# Patient Record
Sex: FEMALE | Race: WHITE | NOT HISPANIC OR LATINO | Employment: FULL TIME
[De-identification: names, ages, dates, MRNs, and addresses within clinical notes are randomized per-mention and may not be internally consistent; named-entity substitution may affect disease eponyms.]

---

## 2017-08-26 ENCOUNTER — HEALTH MAINTENANCE LETTER (OUTPATIENT)
Age: 32
End: 2017-08-26

## 2019-03-20 ENCOUNTER — TRANSFERRED RECORDS (OUTPATIENT)
Dept: MULTI SPECIALTY CLINIC | Facility: CLINIC | Age: 34
End: 2019-03-20

## 2019-03-29 LAB — HPV ABSTRACT: NORMAL

## 2019-05-22 ENCOUNTER — TRANSFERRED RECORDS (OUTPATIENT)
Dept: HEALTH INFORMATION MANAGEMENT | Facility: CLINIC | Age: 34
End: 2019-05-22

## 2019-05-22 LAB — PAP-ABSTRACT: NORMAL

## 2021-05-13 ENCOUNTER — OFFICE VISIT - RIVER FALLS (OUTPATIENT)
Dept: FAMILY MEDICINE | Facility: CLINIC | Age: 36
End: 2021-05-13

## 2021-05-19 ENCOUNTER — OFFICE VISIT - RIVER FALLS (OUTPATIENT)
Dept: FAMILY MEDICINE | Facility: CLINIC | Age: 36
End: 2021-05-19

## 2021-05-19 ASSESSMENT — MIFFLIN-ST. JEOR: SCORE: 1424.57

## 2022-02-12 VITALS
HEIGHT: 66 IN | TEMPERATURE: 98.4 F | HEART RATE: 98 BPM | WEIGHT: 160 LBS | BODY MASS INDEX: 25.71 KG/M2 | DIASTOLIC BLOOD PRESSURE: 86 MMHG | OXYGEN SATURATION: 98 % | SYSTOLIC BLOOD PRESSURE: 122 MMHG

## 2022-02-15 NOTE — NURSING NOTE
Comprehensive Intake Entered On:  5/19/2021 4:03 PM CDT    Performed On:  5/19/2021 3:56 PM CDT by Talia Martínez CMA               Summary   Chief Complaint :   Establish care-Broke JULIANNE calvillo in Jan-has been doing PT at  PT but still c/o pain.    Last Menstrual Period :   5/19/2021 CDT   Weight Measured :   160 lb(Converted to: 160 lb 0 oz, 72.575 kg)    Height Measured :   65.5 in(Converted to: 5 ft 5 in, 166.37 cm)    Body Mass Index :   26.22 kg/m2 (HI)    Body Surface Area :   1.83 m2   Systolic Blood Pressure :   122 mmHg   Diastolic Blood Pressure :   86 mmHg (HI)    Mean Arterial Pressure :   98 mmHg   Peripheral Pulse Rate :   98 bpm   BP Site :   Right arm   BP Method :   Manual   Temperature Tympanic :   98.4 DegF(Converted to: 36.9 DegC)    Oxygen Saturation :   98 %   Talia Martínez CMA - 5/19/2021 3:56 PM CDT   Health Status   Allergies Verified? :   Yes   Medication History Verified? :   Yes   Pre-Visit Planning Status :   Completed   Tobacco Use? :   Never smoker   Talia Martínez CMA - 5/19/2021 3:56 PM CDT   Consents   Consent for Immunization Exchange :   Consent Granted   Consent for Immunizations to Providers :   Consent Granted   Talia Martínez CMA - 5/19/2021 3:56 PM CDT   Meds / Allergies   (As Of: 5/19/2021 4:03:20 PM CDT)   Allergies (Active)   codeine  Estimated Onset Date:   Unspecified ; Created By:   Talia Martínez CMA; Reaction Status:   Active ; Category:   Drug ; Substance:   codeine ; Type:   Allergy ; Updated By:   Talia Martínez CMA; Reviewed Date:   5/19/2021 3:58 PM CDT      Percocet  Estimated Onset Date:   Unspecified ; Created By:   Talia Martníez CMA; Reaction Status:   Active ; Category:   Drug ; Substance:   Percocet ; Type:   Allergy ; Updated By:   Talia Martínez CMA; Reviewed Date:   5/19/2021 3:58 PM CDT      Vicodin  Estimated Onset Date:   Unspecified ; Created By:   Talia Martínez CMA; Reaction Status:   Active ; Category:   Drug ; Substance:   Vicodin ; Type:   Allergy  ; Updated By:   Talia Martínez CMA; Reviewed Date:   5/19/2021 3:58 PM CDT        Medication List   (As Of: 5/19/2021 4:03:20 PM CDT)   Home Meds    bifidobacterium-lactobacillus  :   bifidobacterium-lactobacillus ; Status:   Documented ; Ordered As Mnemonic:   Probiotic + Colostrum ; Simple Display Line:   1 tab, Oral, daily, 0 Refill(s) ; Catalog Code:   bifidobacterium-lactobacillus ; Order Dt/Tm:   5/19/2021 3:59:32 PM CDT          escitalopram  :   escitalopram ; Status:   Documented ; Ordered As Mnemonic:   Lexapro 10 mg oral tablet ; Simple Display Line:   10 mg, 1 tab(s), Oral, daily, 0 Refill(s) ; Catalog Code:   escitalopram ; Order Dt/Tm:   5/19/2021 3:58:47 PM CDT          Miscellaneous Prescription  :   Miscellaneous Prescription ; Status:   Documented ; Ordered As Mnemonic:   Misc Rx ; Simple Display Line:   Trace Minerals, 0 Refill(s) ; Catalog Code:   Miscellaneous Prescription ; Order Dt/Tm:   5/19/2021 3:59:16 PM CDT          Miscellaneous Prescription  :   Miscellaneous Prescription ; Status:   Documented ; Ordered As Mnemonic:   Mold Binders ; Simple Display Line:   0 Refill(s) ; Catalog Code:   Miscellaneous Prescription ; Order Dt/Tm:   5/19/2021 3:59:37 PM CDT          Miscellaneous Prescription  :   Miscellaneous Prescription ; Status:   Documented ; Ordered As Mnemonic:   Vitamin B ; Simple Display Line:   1 tab, Oral, daily, 0 Refill(s) ; Catalog Code:   Miscellaneous Prescription ; Order Dt/Tm:   5/19/2021 4:00:32 PM CDT          omega-3 polyunsaturated fatty acids  :   omega-3 polyunsaturated fatty acids ; Status:   Documented ; Ordered As Mnemonic:   Fish Oil ; Simple Display Line:   1 tab, Oral, daily, 0 Refill(s) ; Catalog Code:   omega-3 polyunsaturated fatty acids ; Order Dt/Tm:   5/19/2021 3:59:25 PM CDT            ID Risk Screen   Recent Travel History :   No recent travel   Family Member Travel History :   No recent travel   Other Exposure to Infectious Disease :   Unknown    COVID-19 Testing Status :   No positive COVID-19 test   Talia Martínez CMA - 5/19/2021 3:56 PM CDT   Social History   Social History   (As Of: 5/19/2021 4:03:20 PM CDT)   Tobacco:  Denies Tobacco Use      Never (less than 100 in lifetime)   (Last Updated: 5/19/2021 4:01:38 PM CDT by Talia Martínez CMA)          Electronic Cigarette/Vaping:  High Risk      Electronic Cigarette Use: Use, within last 90 days.  Type: Cannabinoid infused.   (Last Updated: 5/19/2021 4:01:34 PM CDT by Talia Martínez CMA)

## 2022-02-15 NOTE — NURSING NOTE
CAGE Assessment Entered On:  5/25/2021 7:24 AM CDT    Performed On:  5/19/2021 7:24 AM CDT by Malinda Andrade CMA               Assessment   Have you ever felt you should cut down on your drinking :   No   Have people annoyed you by criticizing your drinking :   No   Have you ever felt bad or guilty about your drinking :   No   Have you ever taken a drink first thing in the morning to steady your nerves or get rid of a hangover (Eye-opener) :   No   CAGE Score :   0    Malinda Andrade CMA - 5/25/2021 7:24 AM CDT

## 2022-02-15 NOTE — TELEPHONE ENCOUNTER
---------------------  From: Laura Huang MD   Sent: 5/27/2021 10:39:25 AM CDT  Subject: MRI results     ** Submitted: **  Order:Referral (Request)  Details:  5/27/2021 10:37 AM CDT, Referred to: Physical Therapy, Referred to: needs myofascial therapy for chronic back pain, Reason for referral: patient will contact us if needs assistance, Back pain  Compression fracture of L1 vertebra         Signed by Laura Huang MD  5/27/2021 3:37:00 PM [facility]    Called patient with MRI results. Reviewed in details.    Discussed prior PT. Had good success earlier with myofascial release but not getting relief with current therapy. Will check into insurance and let us know if we can help.

## 2022-02-15 NOTE — TELEPHONE ENCOUNTER
---------------------  From: Laura Huang MD   To: [facility] Message Pool ([id number]);     Sent: 5/21/2021 8:48:37 AM CDT  Subject: XR results     ** Submitted: **  Order:MRI Lumbar Spine w/o Contrast (Request)  Details:  Compression fracture of L1 vertebra  Back pain         Signed by Laura Huang MD  5/21/2021 1:48:00 PM [facility]    Please call patient and let her know that radiology read as a healed compression fracture and that disc spaces and alignment were otherwise preserved. As we discussed last night, MRI is next step. Order is placed.Pt notified and no questions-agreed with referral.---------------------  From: Talia Martínez CMA ([facility] Message Pool ([id number]))   To: Referral Coordinators Pool ([id number]);     Sent: 5/21/2021 9:47:34 AM CDT  Subject: FW: XR resultsNoted

## 2022-02-15 NOTE — NURSING NOTE
Depression Screening Entered On:  5/25/2021 7:25 AM CDT    Performed On:  5/19/2021 7:24 AM CDT by Malinda Andrade CMA               Depression Screening   Little Interest - Pleasure in Activities :   Not at all   Feeling Down, Depressed, Hopeless :   Not at all   Initial Depression Screen Score :   0 Score   Poor Appetite or Overeating :   Several days   Trouble Falling or Staying Asleep :   Not at all   Feeling Tired or Little Energy :   Several days   Feeling Bad About Yourself :   Not at all   Trouble Concentrating :   Not at all   Moving or Speaking Slowly :   Not at all   Thoughts Better Off Dead or Hurting Self :   Not at all   Difficulty at Work, Home, Getting Along :   Not difficult at all   Detailed Depression Screen Score :   2    Total Depression Screen Score :   2    Malinda Andrade CMA - 5/25/2021 7:24 AM CDT

## 2022-02-15 NOTE — PROGRESS NOTES
Patient:   MENDEZ BROOKS            MRN: [id number]            FIN: [id number]               Age:   36 years     Sex:  Female     :  1985   Associated Diagnoses:   Compression fracture of L1 vertebra   Author:   Laura Huang MD      Chief Complaint   2021 3:56 PM CDT    Establish care-Broke L1 vertabrae in -has been doing PT at [facility] but still c/o pain.      History of Present Illness   patient with L1 vertebral compression fraccture in January from sleding  had immediate pain, went to ER, XR showed L1 compression fracture  seen by PT first at [facility] then at [facility], has been very compliant, records from physical therapy are referred  pain has been in the same location  has not been improving  patient is very active, [place]  has not had any imaging since the initial ER visit  patient has history of multiple sclerosis, also following with functional medicine physician, history of Lyme disease    no prior fractures that she can remember      Health Status   Allergies:    Allergic Reactions (All)  Severity Not Documented  Codeine (No reactions were documented)  Percocet (No reactions were documented)  Vicodin (No reactions were documented)  Canceled/Inactive Reactions (All)  No known allergies   Medications:  (Selected)   Documented Medications  Documented  Fish Oil: 1 tab, Oral, daily, 0 Refill(s), Type: Maintenance  Lexapro 10 mg oral tablet: = 1 tab(s) ( 10 mg ), Oral, daily, 0 Refill(s), Type: Maintenance  Misc Rx: Trace Minerals, 0 Refill(s), Type: Maintenance  Mold Binders: Mold Binders, 0 Refill(s), Type: Maintenance  Probiotic + Colostrum: 1 tab, Oral, daily, 0 Refill(s), Type: Maintenance  Vitamin B: Vitamin B, 1 tab, Oral, daily, 0 Refill(s), Type: Maintenance      Physical Examination   Vital Signs   2021 3:56 PM CDT Temperature Tympanic 98.4 DegF    Peripheral Pulse Rate 98 bpm    Systolic Blood Pressure 122 mmHg    Diastolic Blood Pressure 86 mmHg  HI    Mean  Arterial Pressure 98 mmHg    BP Site Right arm    BP Method Manual    Oxygen Saturation 98 %      Measurements from flowsheet : Measurements   5/19/2021 3:56 PM CDT Height Measured - Standard 65.5 in    Weight Measured - Standard 160 lb    BSA 1.83 m2    Body Mass Index 26.22 kg/m2  HI      General:  Alert and oriented, No acute distress.    on exam of back, no bony tenderness, pain is lateral to spine, worse with extension, mildly worse with lateral movements, pain starts upper lumbar      Impression and Plan   Diagnosis     Compression fracture of L1 vertebra ([id number] S32.010A).     Course:  Not improving.    Plan:  Will check lumbar spine XR today. Unless significant finding, will likely need MRI for next steps.

## 2022-02-28 DIAGNOSIS — F41.1 GAD (GENERALIZED ANXIETY DISORDER): Primary | ICD-10-CM

## 2022-02-28 RX ORDER — ESCITALOPRAM OXALATE 10 MG/1
10 TABLET ORAL DAILY
Qty: 30 TABLET | Refills: 0 | Status: SHIPPED | OUTPATIENT
Start: 2022-02-28 | End: 2022-03-09

## 2022-02-28 NOTE — TELEPHONE ENCOUNTER
Patient is needing a refill on Lexaprox 10 mg, patient states she has about a weeks worth left of the medication. Patient uses [facility] in [place]. Patient did schedule a med check with GADIEL.

## 2022-02-28 NOTE — TELEPHONE ENCOUNTER
Prescription approved per [facility] Refill Protocol.  Office visit on 3/9/22  RAFA Scott  [facility]

## 2022-03-02 NOTE — LETTER
(Inserted Image. Unable to display)   January 27, 2022  MENDEZ BROOKS   [address] [place] [address]        Dear MENDEZ,    Thank you for selecting [facility] for your healthcare needs.    Our records indicate you are due for the following services:     Annual Physical     (FYI   Regarding office visits: In some instances, a video visit or telephone visit may be offered as an option.)    To schedule an appointment or if you have further questions, please contact your clinic at [phone].    Powered by [employer]    Sincerely,    Laura Huang MD

## 2022-03-06 PROBLEM — F41.0 PANIC ATTACK: Status: ACTIVE | Noted: 2022-03-06

## 2022-03-06 PROBLEM — F41.1 GENERALIZED ANXIETY DISORDER: Status: ACTIVE | Noted: 2022-03-06

## 2022-03-06 PROBLEM — S32.000A CLOSED WEDGE FRACTURE OF LUMBAR VERTEBRA (H): Status: ACTIVE | Noted: 2022-03-06

## 2022-03-06 PROBLEM — A69.20 LYME DISEASE: Status: ACTIVE | Noted: 2022-03-06

## 2022-03-06 PROBLEM — M67.40 GANGLION OF TENDON SHEATH: Status: ACTIVE | Noted: 2022-03-06

## 2022-03-06 PROBLEM — M25.50 PAIN IN JOINT, MULTIPLE SITES: Status: ACTIVE | Noted: 2022-03-06

## 2022-03-06 PROBLEM — M54.2 NECK PAIN: Status: ACTIVE | Noted: 2022-03-06

## 2022-03-06 PROBLEM — L70.9 ACNE: Status: ACTIVE | Noted: 2022-03-06

## 2022-03-09 ENCOUNTER — OFFICE VISIT (OUTPATIENT)
Dept: FAMILY MEDICINE | Facility: CLINIC | Age: 37
End: 2022-03-09
Payer: COMMERCIAL

## 2022-03-09 ENCOUNTER — TELEPHONE (OUTPATIENT)
Dept: NEUROLOGY | Facility: CLINIC | Age: 37
End: 2022-03-09

## 2022-03-09 VITALS
BODY MASS INDEX: 24.93 KG/M2 | SYSTOLIC BLOOD PRESSURE: 112 MMHG | DIASTOLIC BLOOD PRESSURE: 77 MMHG | TEMPERATURE: 97.8 F | HEART RATE: 76 BPM | HEIGHT: 66 IN | WEIGHT: 155.1 LBS

## 2022-03-09 DIAGNOSIS — G35 MULTIPLE SCLEROSIS (H): ICD-10-CM

## 2022-03-09 DIAGNOSIS — F41.1 GAD (GENERALIZED ANXIETY DISORDER): ICD-10-CM

## 2022-03-09 DIAGNOSIS — Z00.00 WELL ADULT EXAM: Primary | ICD-10-CM

## 2022-03-09 PROCEDURE — 99395 PREV VISIT EST AGE 18-39: CPT | Performed by: FAMILY MEDICINE

## 2022-03-09 PROCEDURE — 96127 BRIEF EMOTIONAL/BEHAV ASSMT: CPT | Performed by: FAMILY MEDICINE

## 2022-03-09 PROCEDURE — 99214 OFFICE O/P EST MOD 30 MIN: CPT | Mod: 25 | Performed by: FAMILY MEDICINE

## 2022-03-09 RX ORDER — ESCITALOPRAM OXALATE 10 MG/1
10 TABLET ORAL DAILY
Qty: 90 TABLET | Refills: 4 | Status: SHIPPED | OUTPATIENT
Start: 2022-03-09 | End: 2022-04-04

## 2022-03-09 ASSESSMENT — ENCOUNTER SYMPTOMS
PARESTHESIAS: 1
ARTHRALGIAS: 1

## 2022-03-09 NOTE — TELEPHONE ENCOUNTER
[facility] Call Center    Phone Message    May a detailed message be left on voicemail: yes     Reason for Call: Appointment Intake    Referring Provider Name: Laura Huang MD   Diagnosis and/or Symptoms: MS    Patient would like to schedule with Dr. Nguyen at the [facility].    Please call her to schedule when Dr. Nguyen's schedule comes out.    Action Taken: Message routed to:  [facility] Neurology    Travel Screening: Not Applicable

## 2022-03-09 NOTE — PROGRESS NOTES
SUBJECTIVE:   CC: Robina Desir is an 37 year old woman who presents for preventive health visit.       Patient here for wellness checkup    Itchy skin on right arm since giving blood. Has been going on for 2-3 weeks. Has not tried anything. Will try topical corticosteroids. Does have many moles.     Diagnosed with multiple sclerosis at age 16. Does not see neurology. Last MRI 2019 at [facility]  Follows with Dr. Reddy for MS, Lyme, and mold issues.   Her prior neurologist was at [facility]. Interested in switching neurologists.    Check moles.    Escitalopram working well. Due for refills. No side effects      Healthy Habits:     Getting at least 3 servings of Calcium per day:  Yes    Bi-annual eye exam:  Yes    Dental care twice a year:  Yes    Sleep apnea or symptoms of sleep apnea:  None    Diet:  Gluten-free/reduced    Frequency of exercise:  1 day/week    Duration of exercise:  Less than 15 minutes    Taking medications regularly:  Yes    Medication side effects:  None    PHQ-2 Total Score: 2    Additional concerns today:  Yes      Pap smear done on this date: 3/2019 (approximately), by this group: [facility] (in [facility]), results were normal.             Today's PHQ-2 Score:   PHQ-2 ( 1999 [employer]) 3/9/2022   Q1: Little interest or pleasure in doing things 1   Q2: Feeling down, depressed or hopeless 1   PHQ-2 Score 2   Q1: Little interest or pleasure in doing things Several days   Q2: Feeling down, depressed or hopeless Several days   PHQ-2 Score 2       Abuse: Current or Past (Physical, Sexual or Emotional) - No  Do you feel safe in your environment? Yes    Have you ever done Advance Care Planning? (For example, a Health Directive, POLST, or a discussion with a medical provider or your loved ones about your wishes): No, advance care planning information given to patient to review.  Patient plans to discuss their wishes with loved ones or provider.      Social History  "    Tobacco Use     Smoking status: Never Smoker     Smokeless tobacco: Never Used   Substance Use Topics     Alcohol use: Yes     If you drink alcohol do you typically have >3 drinks per day or >7 drinks per week? No    Alcohol Use 3/9/2022   Prescreen: >3 drinks/day or >7 drinks/week? No   Prescreen: >3 drinks/day or >7 drinks/week? -       Reviewed orders with patient.  Reviewed health maintenance and updated orders accordingly - Yes      Breast Cancer Screening:    Breast CA Risk Assessment (FHS-7) 3/9/2022   Do you have a family history of breast, colon, or ovarian cancer? No / Unknown         Patient under 40 years of age: Routine Mammogram Screening not recommended.   Pertinent mammograms are reviewed under the imaging tab.    History of abnormal Pap smear: NO - age 30-65 PAP every 5 years with negative HPV co-testing recommended  Last pap 3/20/2019 with negative HPV    Reviewed and updated as needed this visit by clinical staff   Tobacco  Allergies  Meds      Soc Hx        Reviewed and updated as needed this visit by Provider                 Past Medical History:   Diagnosis Date     Allergic rhinitis due to other allergen      Dysmenorrhea      Irritable bowel syndrome      Multiple sclerosis (H) 2001     Other forms of migraine, without mention of intractable migraine without mention of status migrainosus      Profound, moderate or severe vision impairment, not further specified 2001    right eye 30% vision d/t MS     Urinary frequency       Past Surgical History:   Procedure Laterality Date     [facility] REVISION OF UNSTABLE PATELLA  2000    left knee         Review of Systems   Musculoskeletal: Positive for arthralgias.   Neurological: Positive for paresthesias.   All other systems reviewed and are negative.         OBJECTIVE:   /77 (BP Location: Right arm, Cuff Size: Adult Regular)   Pulse 76   Temp 97.8  F (36.6  C) (Tympanic)   Ht 1.676 m (5' 6\")   Wt 70.4 kg (155 lb 1.6 oz)   BMI 25.03 kg/m  "   Physical Exam  Vitals and nursing note reviewed.   Constitutional:       Appearance: Normal appearance. She is normal weight.   HENT:      Head: Normocephalic and atraumatic.      Right Ear: Tympanic membrane, ear canal and external ear normal.      Left Ear: Tympanic membrane, ear canal and external ear normal.      Nose: Nose normal.      Mouth/Throat:      Mouth: Mucous membranes are moist.      Pharynx: Oropharynx is clear.   Eyes:      Extraocular Movements: Extraocular movements intact.      Conjunctiva/sclera: Conjunctivae normal.      Pupils: Pupils are equal, round, and reactive to light.   Neck:      Thyroid: No thyroid mass or thyromegaly.   Cardiovascular:      Rate and Rhythm: Normal rate and regular rhythm.      Pulses: Normal pulses.      Heart sounds: Normal heart sounds.   Pulmonary:      Effort: Pulmonary effort is normal.      Breath sounds: Normal breath sounds.   Chest:   Breasts:      Right: Normal. No mass, nipple discharge, axillary adenopathy or supraclavicular adenopathy.      Left: Normal. No mass, nipple discharge, axillary adenopathy or supraclavicular adenopathy.       Abdominal:      General: Bowel sounds are normal.      Palpations: Abdomen is soft.   Musculoskeletal:         General: Normal range of motion.      Cervical back: Normal range of motion and neck supple.   Lymphadenopathy:      Cervical: No cervical adenopathy.      Upper Body:      Right upper body: No supraclavicular or axillary adenopathy.      Left upper body: No supraclavicular or axillary adenopathy.   Skin:     General: Skin is warm and dry.      Comments: Patient with benign appearing nevi on chin, back, right wrist. Has dermatofibroma on left calf. No abnormal skin lesions seen   Neurological:      Mental Status: She is alert.   Psychiatric:         Mood and Affect: Mood normal.         Thought Content: Thought content normal.           Diagnostic Test Results:  Neurology notes from 2019 from [place], consent form  "signed for [facility] Everywhere    ASSESSMENT/PLAN:   (Z00.00) Well adult exam  (primary encounter diagnosis)  Comment: Stable and up-to-date  Plan: Next Pap smear due in 2024.  No early cancer screening indicated by family history.  Consider fasting labs at age 46    (G35) Multiple sclerosis (H)  Comment: Not currently on any disease modulating treatment  Plan: MR Brain w/o & w Contrast, MR Thoracic Spine         w/o & w Contrast, MRI CERVICAL SPINE WO&W CONT,        Adult Neurology  Referral        Will order MRIs as she has not had them for approximately 3 years.  She is interested in following up with physician who is new to [facility].  I have put through the consult for adult neurology referral.  She will let me know if she has any concerns or any issues with getting scheduled     (F41.1) KIRSTIN (generalized anxiety disorder)  Comment: Stable  Plan: escitalopram (LEXAPRO) 10 MG tablet        Continue current dose of Escitalopram refilled for a year      Patient has been advised of split billing requirements and indicates understanding: Yes    COUNSELING:  Reviewed preventive health counseling, as reflected in patient instructions       Regular exercise       Healthy diet/nutrition    Estimated body mass index is 25.03 kg/m  as calculated from the following:    Height as of this encounter: 1.676 m (5' 6\").    Weight as of this encounter: 70.4 kg (155 lb 1.6 oz).        She reports that she has never smoked. She has never used smokeless tobacco.      Counseling Resources:  ATP IV Guidelines  Pooled Cohorts Equation Calculator  Breast Cancer Risk Calculator  BRCA-Related Cancer Risk Assessment: FHS-7 Tool  FRAX Risk Assessment  ICSI Preventive Guidelines  Dietary Guidelines for Americans, 2010  USDA's MyPlate  ASA Prophylaxis  Lung CA Screening    Laura Huang MD  [facility]  "

## 2022-04-04 ENCOUNTER — TELEPHONE (OUTPATIENT)
Dept: FAMILY MEDICINE | Facility: CLINIC | Age: 37
End: 2022-04-04
Payer: COMMERCIAL

## 2022-04-04 DIAGNOSIS — F41.1 GAD (GENERALIZED ANXIETY DISORDER): ICD-10-CM

## 2022-04-04 RX ORDER — ESCITALOPRAM OXALATE 10 MG/1
10 TABLET ORAL DAILY
Qty: 90 TABLET | Refills: 4 | Status: SHIPPED | OUTPATIENT
Start: 2022-04-04 | End: 2023-04-28

## 2022-04-04 NOTE — TELEPHONE ENCOUNTER
Reason for Call:  Medication or medication refill:    Do you use a [facility]?  Name of the pharmacy and phone number for the current request:  [facility]    Name of the medication requested: escitalopram    Other request: pt needs Rx faxed to [facility] @ [phone]    Can we leave a detailed message on this number? YES    Phone number patient can be reached at: Cell number on file:    Telephone Information:   Mobile [phone]       Best Time: anytime    Call taken on 4/4/2022 at 10:16 AM by Ifrah Leon

## 2022-04-04 NOTE — TELEPHONE ENCOUNTER
RX sent to [facility]: 3/9/22 for #90    [facility] will give patient 10 day supply as waiting on [facility].

## 2022-04-21 ENCOUNTER — TELEPHONE (OUTPATIENT)
Dept: FAMILY MEDICINE | Facility: CLINIC | Age: 37
End: 2022-04-21
Payer: COMMERCIAL

## 2022-04-21 DIAGNOSIS — G35 MULTIPLE SCLEROSIS (H): Primary | ICD-10-CM

## 2022-04-21 NOTE — TELEPHONE ENCOUNTER
[facility] calling and patient has a MRI scheduled for next week they stated that they will need a new order need to add cervical and thoracic with and without contrast. Please send new order.

## 2022-05-01 ENCOUNTER — HEALTH MAINTENANCE LETTER (OUTPATIENT)
Age: 37
End: 2022-05-01

## 2022-05-03 ENCOUNTER — TELEPHONE (OUTPATIENT)
Dept: NEUROLOGY | Facility: CLINIC | Age: 37
End: 2022-05-03
Payer: COMMERCIAL

## 2022-05-03 NOTE — TELEPHONE ENCOUNTER
Health Call Center    Phone Message    May a detailed message be left on voicemail: yes     Reason for Call: Appointment Intake    Referring Provider Name: IVAN GALLEGO  Diagnosis and/or Symptoms: MS    Patient is referred to be scheduled with Dr. Nguyen. Can you please add her to the wait list to be scheduled.     Please call patient with additional questions at # [phone]    Action Taken: Message routed to:  [facility] Neurology     Travel Screening: Not Applicable

## 2022-06-20 ENCOUNTER — LAB (OUTPATIENT)
Dept: LAB | Facility: CLINIC | Age: 37
End: 2022-06-20
Payer: COMMERCIAL

## 2022-06-20 ENCOUNTER — OFFICE VISIT (OUTPATIENT)
Dept: NEUROLOGY | Facility: CLINIC | Age: 37
End: 2022-06-20
Payer: COMMERCIAL

## 2022-06-20 VITALS — SYSTOLIC BLOOD PRESSURE: 125 MMHG | DIASTOLIC BLOOD PRESSURE: 83 MMHG | HEART RATE: 88 BPM

## 2022-06-20 DIAGNOSIS — E55.9 VITAMIN D DEFICIENCY: ICD-10-CM

## 2022-06-20 DIAGNOSIS — G35 MS (MULTIPLE SCLEROSIS) (H): Primary | ICD-10-CM

## 2022-06-20 PROCEDURE — 36415 COLL VENOUS BLD VENIPUNCTURE: CPT

## 2022-06-20 PROCEDURE — 99204 OFFICE O/P NEW MOD 45 MIN: CPT | Performed by: PSYCHIATRY & NEUROLOGY

## 2022-06-20 PROCEDURE — 82306 VITAMIN D 25 HYDROXY: CPT

## 2022-06-20 NOTE — PROGRESS NOTES
Date of Service: 6/20/2022    [facility] Neurology   MS Clinic Visit    History of present illness: 37-year-old woman with a history of anxiety disorder who presents for evaluation of multiple sclerosis.    She is accompanied by her , Caio, who provides history independently.  Past history as documented in [facility] notes was personally reviewed.    Disease onset at age 16 when she had an episode of optic neuritis on the left side.  She was started on Avonex.  1 year later she had another episode of optic neuritis, but this occurred on the right side.  She had incomplete recovery of symptoms.  She has residual blurriness from the right eye.    She continues on Avonex for a number of years.  She did experience some side effects including flulike side effects.  She ultimately discontinued the medication 2008 when she was planning to consider pregnancy.  She has not been on disease modifying since.    She did have 1 episode of sensory myelopathy in 2007.  This occurred while she was on Avonex.  She became numb from the waist down.  Fortunately she experienced complete recovery of the course of 5 months.    She will experience occasional tingling here and there, though notes that symptoms will last for 5 to 10 minutes.    Denies any gait limitation.  She has been struggling with lower back pain and has been working with occupational therapy to address this.    She works with the functional medicine doctor.  They have been focusing on treating moles.  She also follows the autoimmune protocol diet.    She takes a supplement that does have vitamin D3, though she is uncertain of the exact amount of D3.  She takes a fish oil supplement as well.    She has not experienced any new symptoms related to MS since 2007.  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    Disease onset: Age 16, left optic neuritis  Last relapse: 2007, age 22, sensory myelitis waist  down    DMD history:  Avonex [id number]    Allergies   Allergen Reactions     Codeine      Hydrocodone-Acetaminophen Unknown     Oxycodone-Acetaminophen        Current Outpatient Medications   Medication     B Complex Vitamins (B COMPLEX PO)     CLARITIN 10 MG OR TABS     escitalopram (LEXAPRO) 10 MG tablet     Probiotic Product (PROBIOTIC PO)     No current facility-administered medications for this visit.        Past medical, surgical, social and family history was personally reviewed. Pertinent details noted above.     Physical Examination:   /83 (BP Location: Right arm, Patient Position: Sitting, Cuff Size: Adult Regular)   Pulse 88     General: no acute distress  Cranial nerves:   VFFC  PERRL w/no RAPD  EOM full w/no NANO   Face symmetric  Hearing intact  No dysarthria   Motor:   Tone is normal   Bulk is normal     R L  Deltoid  5 5  Biceps  5 5  Triceps 5 5  Wrist ext 5 5  Finger ext 5 5  Finger abd 5 5    Hip flexion 5 5  Knee flexion 5 5  Knee ext 5 5  Ankle d/f 5 5    Reflexes: 2+ and symmetric throughout, babinski absent bilaterally  Sensory: vibration is minimally reduced in the toes, JPS normal in the toes   Romberg is absent  Coordination: no ataxia or dysmetria  Gait: normal base and stride, tandem gait is intact, able to balance on one foot and hop x 5 bilaterally    Tests/Imaging:   Mri imaging was done at [facility] and [facility], but images are not available for review  Per comparison of the reads it seems as though findings are stable between studies    Assessment: 37-year-old woman with relapsing remitting multiple sclerosis.  She has had a relatively benign course of illness.  However there was evidence of active disease per an MRI in 2019.  I need to personally compare studies from 2019 and 2022.    Reassurance was provided that her clinical examination are stable.  The general report between the 2 studies appear stable as well.  She can likely safely remain off of disease  modifying therapy.  We discussed why disease modifying therapy is oftentimes used for multiple sclerosis.    We reviewed the differentiation between relapsing disease and progressive disease.  She will reach out if she experiences any new symptoms concerning for an MS relapse.  She was encouraged to exercise routinely to decrease her risk for progressive disease.  I agree that the autoimmune protocol can be helpful.  I will check her vitamin D level to ensure adequate supplementation.    Plan:   -Obtain images from [facility] and [facility]  - Vitamin D level  - Follow-up in 1 year    Note was completed with the assistance of Dragon Fluency software which can often result in accidental word substitutions.     A total of 45 minutes on the date of service were spent in the care of this patient.   Melany Nguyen MD on 6/20/2022 at 10:03 AM

## 2022-06-20 NOTE — LETTER
6/20/2022         RE: Robina Desir   [address] [facility] [id number]        Dear Colleague,    Thank you for referring your patient, Robina Desir, to the [facility]. Please see a copy of my visit note below.    Date of Service: 6/20/2022    [facility] Neurology   MS Clinic Visit    History of present illness: 37-year-old woman with a history of anxiety disorder who presents for evaluation of multiple sclerosis.    She is accompanied by her , Caio, who provides history independently.  Past history as documented in [facility] notes was personally reviewed.    Disease onset at age 16 when she had an episode of optic neuritis on the left side.  She was started on Avonex.  1 year later she had another episode of optic neuritis, but this occurred on the right side.  She had incomplete recovery of symptoms.  She has residual blurriness from the right eye.    She continues on Avonex for a number of years.  She did experience some side effects including flulike side effects.  She ultimately discontinued the medication 2008 when she was planning to consider pregnancy.  She has not been on disease modifying since.    She did have 1 episode of sensory myelopathy in 2007.  This occurred while she was on Avonex.  She became numb from the waist down.  Fortunately she experienced complete recovery of the course of 5 months.    She will experience occasional tingling here and there, though notes that symptoms will last for 5 to 10 minutes.    Denies any gait limitation.  She has been struggling with lower back pain and has been working with occupational therapy to address this.    She works with the functional medicine doctor.  They have been focusing on treating moles.  She also follows the autoimmune protocol diet.    She takes a supplement that does have vitamin D3, though she is uncertain of the exact amount of D3.  She takes a fish oil supplement as well.    She has  not experienced any new symptoms related to MS since 2007.  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    Disease onset: Age 16, left optic neuritis  Last relapse: 2007, age 22, sensory myelitis waist down    DMD history:  Avonex [id number]    Allergies   Allergen Reactions     Codeine      Hydrocodone-Acetaminophen Unknown     Oxycodone-Acetaminophen        Current Outpatient Medications   Medication     B Complex Vitamins (B COMPLEX PO)     CLARITIN 10 MG OR TABS     escitalopram (LEXAPRO) 10 MG tablet     Probiotic Product (PROBIOTIC PO)     No current facility-administered medications for this visit.        Past medical, surgical, social and family history was personally reviewed. Pertinent details noted above.     Physical Examination:   /83 (BP Location: Right arm, Patient Position: Sitting, Cuff Size: Adult Regular)   Pulse 88     General: no acute distress  Cranial nerves:   VFFC  PERRL w/no RAPD  EOM full w/no NANO   Face symmetric  Hearing intact  No dysarthria   Motor:   Tone is normal   Bulk is normal     R L  Deltoid  5 5  Biceps  5 5  Triceps 5 5  Wrist ext 5 5  Finger ext 5 5  Finger abd 5 5    Hip flexion 5 5  Knee flexion 5 5  Knee ext 5 5  Ankle d/f 5 5    Reflexes: 2+ and symmetric throughout, babinski absent bilaterally  Sensory: vibration is minimally reduced in the toes, JPS normal in the toes   Romberg is absent  Coordination: no ataxia or dysmetria  Gait: normal base and stride, tandem gait is intact, able to balance on one foot and hop x 5 bilaterally    Tests/Imaging:   Mri imaging was done at [facility] and [facility], but images are not available for review  Per comparison of the reads it seems as though findings are stable between studies    Assessment: 37-year-old woman with relapsing remitting multiple sclerosis.  She has had a relatively benign course of illness.  However there was evidence of active disease per  an MRI in 2019.  I need to personally compare studies from 2019 and 2022.    Reassurance was provided that her clinical examination are stable.  The general report between the 2 studies appear stable as well.  She can likely safely remain off of disease modifying therapy.  We discussed why disease modifying therapy is oftentimes used for multiple sclerosis.    We reviewed the differentiation between relapsing disease and progressive disease.  She will reach out if she experiences any new symptoms concerning for an MS relapse.  She was encouraged to exercise routinely to decrease her risk for progressive disease.  I agree that the autoimmune protocol can be helpful.  I will check her vitamin D level to ensure adequate supplementation.    Plan:   -Obtain images from [facility] and [facility]  - Vitamin D level  - Follow-up in 1 year    Note was completed with the assistance of Dragon Fluency software which can often result in accidental word substitutions.     A total of 45 minutes on the date of service were spent in the care of this patient.   Melany Nguyen MD on 6/20/2022 at 10:03 AM          Again, thank you for allowing me to participate in the care of your patient.        Sincerely,        Melany Nguyen MD

## 2022-06-20 NOTE — PATIENT INSTRUCTIONS
You are doing very well despite your long history of MS    I think it is reasonable to remain off of treatment if your recent MRI demonstrates no change from your prior study at [facility]     Vitamin D level today     Exercise aim for 150 minutes throughout the week intense enough that you are about to break a sweat     Follow up in 1 year

## 2022-06-21 LAB — DEPRECATED CALCIDIOL+CALCIFEROL SERPL-MC: 25 UG/L (ref 20–75)

## 2022-06-28 ENCOUNTER — TELEPHONE (OUTPATIENT)
Dept: NEUROLOGY | Facility: CLINIC | Age: 37
End: 2022-06-28

## 2022-06-28 NOTE — TELEPHONE ENCOUNTER
Please assist in getting last two MRI studies for me to review     2022 - [facility]  2019 - [place]     Thank you! Melany Nguyen MD on 6/28/2022 at 1:57 PM

## 2022-06-28 NOTE — TELEPHONE ENCOUNTER
COREY: I spoke to Echo in the Imaging dept with [facility] ([facility] P: [phone]) and Billy with [facility] ([facility], P: [phone]) and asked them to get the images for you to review. Please let us know if you do not receive the images.     FERNANDO Nguyen on 6/28/2022 at 2:39 PM

## 2022-06-30 NOTE — TELEPHONE ENCOUNTER
KIMBERLEEI: I called [facility] and spoke to Mirna in Radiology. I've asked her to push the images through pacs as soon as possible. Please let me know if you do not receive them. Thank you.      FERNANDO Nguyen on 6/30/2022 at 3:42 PM

## 2022-06-30 NOTE — TELEPHONE ENCOUNTER
Thank you!   The [place] images are now in our system, but we have not received the images from [facility] - can you follow up with them?     Melany Nguyen MD on 6/30/2022 at 12:32 PM

## 2022-07-06 NOTE — TELEPHONE ENCOUNTER
Please notify patient that I reviewed/compared images from 2019 and 2022     No new lesions   No active lesions     Melany Nguyen MD on 7/6/2022 at 3:45 PM

## 2022-07-07 NOTE — TELEPHONE ENCOUNTER
Relayed results onto pt and she verbalized understanding. Pt did want Dr. Nguyen to be aware that she does experience quite a bit of spacticity in her legs every night. She states that this is not a concern for her, but wanted Dr. Nguyen to be aware.     Stella Boone RN on 7/7/2022 at 1:36 PM

## 2022-07-08 NOTE — TELEPHONE ENCOUNTER
Stretching the legs routinely 15-20 minutes twice per day can help     If she would like to try baclofen, this is a muscle relaxant that can be helpful and is generally well tolerated, she can let me know   Melany Nguyen MD on 7/8/2022 at 8:15 AM

## 2022-07-08 NOTE — TELEPHONE ENCOUNTER
Left message for pt relaying Dr. Nguyen's recommendations. Pt to call back if she would like to try the Baclofen.     Stella Boone RN on 7/8/2022 at 12:44 PM

## 2022-09-10 ENCOUNTER — OFFICE VISIT (OUTPATIENT)
Dept: URGENT CARE | Facility: URGENT CARE | Age: 37
End: 2022-09-10
Payer: COMMERCIAL

## 2022-09-10 VITALS
DIASTOLIC BLOOD PRESSURE: 74 MMHG | BODY MASS INDEX: 27.55 KG/M2 | TEMPERATURE: 98.3 F | HEART RATE: 88 BPM | SYSTOLIC BLOOD PRESSURE: 118 MMHG | OXYGEN SATURATION: 98 % | WEIGHT: 170.7 LBS

## 2022-09-10 DIAGNOSIS — R05.9 COUGH: Primary | ICD-10-CM

## 2022-09-10 PROCEDURE — 99213 OFFICE O/P EST LOW 20 MIN: CPT

## 2022-09-10 RX ORDER — GUAIFENESIN/DEXTROMETHORPHAN 100-10MG/5
10 SYRUP ORAL EVERY 4 HOURS PRN
Qty: 473 ML | Refills: 0 | Status: SHIPPED | OUTPATIENT
Start: 2022-09-10 | End: 2022-09-20

## 2022-09-10 NOTE — PROGRESS NOTES
"  Assessment & Plan   Problem List Items Addressed This Visit    None     Visit Diagnoses     Cough    -  Primary    Relevant Medications    guaiFENesin-dextromethorphan (ROBITUSSIN DM) 100-10 MG/5ML syrup      Postviral cough syndrome.  Okay to use Mucinex during the day and Robitussin-AC at night.  Prescription sent to pharmacy.  Given red flags for urgent follow-up.  Do not think additional course antibiotics is warranted at this point.  BMI:   Estimated body mass index is 27.55 kg/m  as calculated from the following:    Height as of 3/9/22: 1.676 m (5' 6\").    Weight as of this encounter: 77.4 kg (170 lb 11.2 oz).       [facility] URGENT CARE [place] FLORA Quarles is a 37 year old  presenting for the following health issues:  Sinus Problem, Cough (X 4 weeks and still not improving, negative COVID tests, hard time sleeping ), Pharyngitis, and Plugged Ears  Did take 10-day course of antibiotics several weeks ago although she is unsure which antibiotic.  I am unable to find this in the chart visit was conducted via virtual visit.    HPI     Acute Illness  Acute illness concerns: cough, sinus, sore throat, plugged ears  Onset/Duration: 4 weeks  Symptoms:  Fever: No  Chills/Sweats: No  Headache (location?): YES  Sinus Pressure: YES  Conjunctivitis:  No  Ear Pain: YES: bilateral  Rhinorrhea: YES  Congestion: No  Sore Throat: YES  Cough: YES-productive of yellow sputum  Wheeze: No  Decreased Appetite: No  Nausea:  yes  Vomiting: No  Diarrhea: No  Dysuria/Freq.: No  Dysuria or Hematuria: No  Fatigue/Achiness: YES  Sick/Strep Exposure: YES, children at home  Therapies tried and outcome: antibiotic (Augmentin), Mucinex, Claritin D       Review of Systems   CONSTITUTIONAL: NEGATIVE for fever, chills, change in weight  ENT/MOUTH: NEGATIVE for ear, mouth and throat problems  RESP: Positive for significant cough.  CV: NEGATIVE for chest pain, palpitations or peripheral edema    "   Objective    /74 (BP Location: Right arm)   Pulse 88   Temp 98.3  F (36.8  C) (Tympanic)   Wt 77.4 kg (170 lb 11.2 oz)   SpO2 98%   BMI 27.55 kg/m    Body mass index is 27.55 kg/m .  Physical Exam   GENERAL: healthy, alert and no distress  EYES: Eyes grossly normal to inspection, PERRL and conjunctivae and sclerae normal  HENT: ear canals and TM's normal, nose and mouth without ulcers or lesions  NECK: no adenopathy, no asymmetry, masses, or scars and thyroid normal to palpation  RESP: lungs clear to auscultation - no rales, rhonchi or wheezes  CV: regular rate and rhythm, normal S1 S2, no S3 or S4, no murmur, click or rub, no peripheral edema and peripheral pulses strong  ABDOMEN: soft, nontender, no hepatosplenomegaly, no masses and bowel sounds normal  MS: no gross musculoskeletal defects noted, no edema  SKIN: no suspicious lesions or rashes  NEURO: Normal strength and tone, mentation intact and speech normal  PSYCH: mentation appears normal, affect normal/brigh

## 2022-10-30 ENCOUNTER — MYC MEDICAL ADVICE (OUTPATIENT)
Dept: FAMILY MEDICINE | Facility: CLINIC | Age: 37
End: 2022-10-30

## 2022-12-26 ENCOUNTER — HEALTH MAINTENANCE LETTER (OUTPATIENT)
Age: 37
End: 2022-12-26

## 2023-01-24 ENCOUNTER — VIRTUAL VISIT (OUTPATIENT)
Dept: FAMILY MEDICINE | Facility: CLINIC | Age: 38
End: 2023-01-24
Payer: COMMERCIAL

## 2023-01-24 DIAGNOSIS — G35 MULTIPLE SCLEROSIS (H): ICD-10-CM

## 2023-01-24 DIAGNOSIS — N39.0 UTI (URINARY TRACT INFECTION), UNCOMPLICATED: Primary | ICD-10-CM

## 2023-01-24 DIAGNOSIS — F41.1 GAD (GENERALIZED ANXIETY DISORDER): ICD-10-CM

## 2023-01-24 PROCEDURE — 99213 OFFICE O/P EST LOW 20 MIN: CPT | Mod: GT | Performed by: INTERNAL MEDICINE

## 2023-01-24 RX ORDER — NITROFURANTOIN 25; 75 MG/1; MG/1
100 CAPSULE ORAL 2 TIMES DAILY
Qty: 14 CAPSULE | Refills: 0 | Status: SHIPPED | OUTPATIENT
Start: 2023-01-24 | End: 2023-08-23

## 2023-01-24 NOTE — PROGRESS NOTES
Robina is a 38 year old who is being evaluated via a billable video visit.      How would you like to obtain your AVS? Rinahart  If the video visit is dropped, the invitation should be resent by: Other e-mail: [email or web address]  Will anyone else be joining your video visit? No      Robina was seen today for urinary problem.    Diagnoses and all orders for this visit:    UTI (urinary tract infection), uncomplicated  Patient's symptoms (dysuria and urgency since the last week without any vaginal discharge, fever, N/V) are typical for UTI. I explained her to do an in-person visit would have been better to assess her condition thoroughly. Per patient, she is not pregnant. In addition to antibiotic therapy, I advised her to hydrate herself sufficiently and drink cranberry juice. She is instructed to seek sooner medical attention if there is any worsening of symptoms or problems.  -     nitroFURantoin macrocrystal-monohydrate (MACROBID) 100 MG capsule; Take 1 capsule (100 mg) by mouth 2 times daily    Multiple sclerosis (H)  She has multiple sclerosis in remission. She is followed by a neurologist.    KIRSTIN (generalized anxiety disorder)  Under good control with Lexapro.      Subjective   Robina is a 38 year old, presenting for the following health issues:  Urinary Problem      History of Present Illness       Reason for visit:  UTI?  Symptom onset:  3-7 days ago  Symptoms include:  Intense feeling in my bladder , hurts to stop peeing but also feels like i can't keep peeing  Symptom intensity:  Moderate  Symptom progression:  Staying the same  Had these symptoms before:  No  What makes it better:  Peeing?    She eats 2-3 servings of fruits and vegetables daily.She consumes 1 sweetened beverage(s) daily.She exercises with enough effort to increase her heart rate 20 to 29 minutes per day.  She exercises with enough effort to increase her heart rate 4 days per week.   She is taking medications regularly.       Review of Systems    Constitutional, HEENT, cardiovascular, pulmonary, GI, , musculoskeletal, neuro, skin, endocrine and psych systems are negative, except as otherwise noted.      Objective    Vitals - Patient Reported  Pain Score: Moderate Pain (5)      Vitals:  No vitals were obtained today due to virtual visit.    Physical Exam   GENERAL: Healthy, alert and no distress  EYES: Eyes grossly normal to inspection.  No discharge or erythema, or obvious scleral/conjunctival abnormalities.  RESP: No audible wheeze, cough, or visible cyanosis.  No visible retractions or increased work of breathing.    SKIN: Visible skin clear. No significant rash, abnormal pigmentation or lesions.  NEURO: Cranial nerves grossly intact.  Mentation and speech appropriate for age.  PSYCH: Mentation appears normal, affect normal/bright, judgement and insight intact, normal speech and appearance well-groomed.        Video-Visit Details    Type of service:  Video Visit     Originating Location (pt. Location): Home  Distant Location (provider location):  On-site  Platform used for Video Visit: BeOnDesk

## 2023-01-24 NOTE — PATIENT INSTRUCTIONS
Take take Macrobid twice a day for 7 days as empiric treatment for UTI  If symptoms does not improve or get worse it is very important you make an appointment to see someone in person  Follow-up with your primary care doctor as you are due for annual physical  Seek sooner medical attention if there is any worsening of symptoms or problems.

## 2023-04-27 DIAGNOSIS — F41.1 GAD (GENERALIZED ANXIETY DISORDER): ICD-10-CM

## 2023-04-28 RX ORDER — ESCITALOPRAM OXALATE 10 MG/1
TABLET ORAL
Qty: 90 TABLET | Refills: 0 | Status: SHIPPED | OUTPATIENT
Start: 2023-04-28 | End: 2023-08-23 | Stop reason: DRUGHIGH

## 2023-04-28 NOTE — TELEPHONE ENCOUNTER
Routing to provider as refill request for review/approval because:  Patient needs to be seen because it has been more than 1 year since last office visit.    Last preventive care visit: 3/9/22  RN sent a BevyUp message to patient to remind her that she is due for her yearly preventive care visit.     Ledy Yeung RN  [facility]

## 2023-06-02 ENCOUNTER — HEALTH MAINTENANCE LETTER (OUTPATIENT)
Age: 38
End: 2023-06-02

## 2023-08-23 ENCOUNTER — OFFICE VISIT (OUTPATIENT)
Dept: FAMILY MEDICINE | Facility: CLINIC | Age: 38
End: 2023-08-23
Payer: COMMERCIAL

## 2023-08-23 VITALS
TEMPERATURE: 97 F | OXYGEN SATURATION: 98 % | DIASTOLIC BLOOD PRESSURE: 68 MMHG | BODY MASS INDEX: 26.87 KG/M2 | HEIGHT: 66 IN | SYSTOLIC BLOOD PRESSURE: 126 MMHG | WEIGHT: 167.2 LBS | HEART RATE: 76 BPM | RESPIRATION RATE: 12 BRPM

## 2023-08-23 DIAGNOSIS — L70.9 ADULT ACNE: ICD-10-CM

## 2023-08-23 DIAGNOSIS — Z00.00 ROUTINE GENERAL MEDICAL EXAMINATION AT A HEALTH CARE FACILITY: Primary | ICD-10-CM

## 2023-08-23 DIAGNOSIS — F41.1 GAD (GENERALIZED ANXIETY DISORDER): ICD-10-CM

## 2023-08-23 PROBLEM — M67.40 GANGLION OF TENDON SHEATH: Status: RESOLVED | Noted: 2022-03-06 | Resolved: 2023-08-23

## 2023-08-23 PROBLEM — M25.50 PAIN IN JOINT, MULTIPLE SITES: Status: RESOLVED | Noted: 2022-03-06 | Resolved: 2023-08-23

## 2023-08-23 PROBLEM — S82.402D: Status: ACTIVE | Noted: 2023-08-11

## 2023-08-23 PROBLEM — S32.000A CLOSED WEDGE FRACTURE OF LUMBAR VERTEBRA (H): Status: RESOLVED | Noted: 2022-03-06 | Resolved: 2023-08-23

## 2023-08-23 PROBLEM — A69.20 LYME DISEASE: Status: RESOLVED | Noted: 2022-03-06 | Resolved: 2023-08-23

## 2023-08-23 PROCEDURE — 99395 PREV VISIT EST AGE 18-39: CPT | Mod: 25 | Performed by: FAMILY MEDICINE

## 2023-08-23 PROCEDURE — 99213 OFFICE O/P EST LOW 20 MIN: CPT | Mod: 25 | Performed by: FAMILY MEDICINE

## 2023-08-23 RX ORDER — ESCITALOPRAM OXALATE 20 MG/1
20 TABLET ORAL DAILY
Qty: 90 TABLET | Refills: 4 | Status: SHIPPED | OUTPATIENT
Start: 2023-08-23 | End: 2024-09-11

## 2023-08-23 RX ORDER — TRETINOIN 1 MG/G
CREAM TOPICAL AT BEDTIME
Qty: 45 G | Refills: 3 | Status: SHIPPED | OUTPATIENT
Start: 2023-08-23

## 2023-08-23 ASSESSMENT — ENCOUNTER SYMPTOMS
DIZZINESS: 0
WEAKNESS: 0
SORE THROAT: 0
SHORTNESS OF BREATH: 0
EYE PAIN: 0
CONSTIPATION: 0
MYALGIAS: 0
FREQUENCY: 0
NAUSEA: 0
COUGH: 0
DYSURIA: 0
ABDOMINAL PAIN: 0
HEARTBURN: 0
HEADACHES: 0
HEMATOCHEZIA: 0
JOINT SWELLING: 0
ARTHRALGIAS: 0
DIARRHEA: 0
CHILLS: 0
PALPITATIONS: 0
BREAST MASS: 0
FEVER: 0
NERVOUS/ANXIOUS: 0
PARESTHESIAS: 0
HEMATURIA: 0

## 2023-08-23 NOTE — PROGRESS NOTES
SUBJECTIVE:   CC: Robina is an 38 year old who presents for preventive health visit.       8/23/2023     7:21 AM   Additional Questions   Roomed by Stella SULLIVAN CMA   Accompanied by None     Here for annual exam.  History of anxiety.  Seeing a therapist.  Has still had ongoing panic attacks.  Would like to increase dose of escitalopram.  Also having more acne.  Particularly breaking out on her face.  Occasionally also notes inflammation affecting moles on her arms.  Has a friend who recommended Retin-A.  She has already tried multiple over-the-counter's without effect.    Healthy Habits:     Getting at least 3 servings of Calcium per day:  Yes    Bi-annual eye exam:  Yes    Dental care twice a year:  Yes    Sleep apnea or symptoms of sleep apnea:  None    Diet:  Gluten-free/reduced    Frequency of exercise:  2-3 days/week    Duration of exercise:  15-30 minutes    Taking medications regularly:  Yes    Barriers to taking medications:  None    Medication side effects:  None    Additional concerns today:  Yes    Social History     Tobacco Use     Smoking status: Never     Passive exposure: Never     Smokeless tobacco: Never   Substance Use Topics     Alcohol use: Yes             8/23/2023     7:13 AM   Alcohol Use   Prescreen: >3 drinks/day or >7 drinks/week? No     Reviewed orders with patient.  Reviewed health maintenance and updated orders accordingly - Yes      Breast Cancer Screening:        3/9/2022     7:37 AM   Breast CA Risk Assessment (FHS-7)   Do you have a family history of breast, colon, or ovarian cancer? No / Unknown         Patient under 40 years of age: Routine Mammogram Screening not recommended.   Pertinent mammograms are reviewed under the imaging tab.    History of abnormal Pap smear: NO - age 30-65 PAP every 5 years with negative HPV co-testing recommended      7/14/2005    12:00 AM   PAP / HPV   PAP (Historical) NIL      Reviewed and updated as needed this visit by clinical staff   Tobacco   "Allergies  Meds  Problems  Med Hx  Surg Hx  Fam Hx          Reviewed and updated as needed this visit by Provider   Tobacco  Allergies  Meds  Problems  Med Hx  Surg Hx  Fam Hx             Review of Systems   Constitutional:  Negative for chills and fever.   HENT:  Negative for congestion, ear pain, hearing loss and sore throat.    Eyes:  Negative for pain and visual disturbance.   Respiratory:  Negative for cough and shortness of breath.    Cardiovascular:  Negative for chest pain, palpitations and peripheral edema.   Gastrointestinal:  Negative for abdominal pain, constipation, diarrhea, heartburn, hematochezia and nausea.   Breasts:  Negative for tenderness, breast mass and discharge.   Genitourinary:  Negative for dysuria, frequency, genital sores, hematuria, pelvic pain, urgency, vaginal bleeding and vaginal discharge.   Musculoskeletal:  Negative for arthralgias, joint swelling and myalgias.   Skin:  Negative for rash.   Neurological:  Negative for dizziness, weakness, headaches and paresthesias.   Psychiatric/Behavioral:  Negative for mood changes. The patient is not nervous/anxious.           OBJECTIVE:   /68   Pulse 76   Temp 97  F (36.1  C)   Resp 12   Ht 1.67 m (5' 5.75\")   Wt 75.8 kg (167 lb 3.2 oz)   LMP 08/16/2023 (Approximate)   SpO2 98%   BMI 27.19 kg/m    Physical Exam  GENERAL: healthy, alert and no distress  EYES: Eyes grossly normal to inspection, PERRL and conjunctivae and sclerae normal  HENT: ear canals and TM's normal, nose and mouth without ulcers or lesions  NECK: no adenopathy, no asymmetry, masses, or scars and thyroid normal to palpation  RESP: lungs clear to auscultation - no rales, rhonchi or wheezes  BREAST: normal without masses, tenderness or nipple discharge and no palpable axillary masses or adenopathy  CV: regular rate and rhythm, normal S1 S2, no S3 or S4, no murmur, click or rub, no peripheral edema and peripheral pulses strong  ABDOMEN: soft, " nontender, no hepatosplenomegaly, no masses and bowel sounds normal  MS: no gross musculoskeletal defects noted, no edema  SKIN: no suspicious lesions or rashes  NEURO: Normal strength and tone, mentation intact and speech normal  PSYCH: mentation appears normal, affect normal/bright        ASSESSMENT/PLAN:   Routine general medical examination at a health care facility  Health maintenance updated, preventive services reviewed, vaccines discussed, questions are answered, patient encouraged to continue annual wellness visits to review preventive services    KIRSTIN (generalized anxiety disorder)  We will try increasing dose of escitalopram to 20 mg, follow-up as needed.  - escitalopram (LEXAPRO) 20 MG tablet; Take 1 tablet (20 mg) by mouth daily    Adult acne  Will do trial of topical Retin-A.  If she would like to see dermatology she will let me know.  - tretinoin (RETIN-A) 0.1 % external cream; Apply topically At Bedtime            COUNSELING:  Reviewed preventive health counseling, as reflected in patient instructions        She reports that she has never smoked. She has never been exposed to tobacco smoke. She has never used smokeless tobacco.          Laura Huang MD  [facility]

## 2023-08-24 ENCOUNTER — TELEPHONE (OUTPATIENT)
Dept: FAMILY MEDICINE | Facility: CLINIC | Age: 38
End: 2023-08-24
Payer: COMMERCIAL

## 2023-08-28 NOTE — TELEPHONE ENCOUNTER
Prior Authorization Approval    Medication: TRETINOIN 0.1 % EX CREA  Authorization Effective Date: 8/28/2023  Authorization Expiration Date: 8/27/2024  Approved Dose/Quantity:   Reference #:     Insurance Company: Comment:  [employer]  Expected CoPay:       CoPay Card Available:      Financial Assistance Needed:   Which Pharmacy is filling the prescription: [facility] [place] [place] - [address]  Pharmacy Notified: Yes  Patient Notified:  **Instructed pharmacy to notify patient when script is ready to /ship.**

## 2023-08-28 NOTE — TELEPHONE ENCOUNTER
Central Prior Authorization Team  Phone: [phone]    PA Initiation    Medication: TRETINOIN 0.1 % EX CREA  Insurance Company: Comment:  [employer]  Pharmacy Filling the Rx: [facility] [place] - [address] [place] [address]  Filling Pharmacy Phone: [phone]  Filling Pharmacy Fax:    Start Date: 8/28/2023

## 2023-12-03 ENCOUNTER — MYC MEDICAL ADVICE (OUTPATIENT)
Dept: FAMILY MEDICINE | Facility: CLINIC | Age: 38
End: 2023-12-03
Payer: COMMERCIAL

## 2023-12-03 DIAGNOSIS — G89.29 CHRONIC PAIN OF LEFT KNEE: Primary | ICD-10-CM

## 2023-12-03 DIAGNOSIS — M25.562 CHRONIC PAIN OF LEFT KNEE: Primary | ICD-10-CM

## 2024-04-23 ENCOUNTER — MYC MEDICAL ADVICE (OUTPATIENT)
Dept: FAMILY MEDICINE | Facility: CLINIC | Age: 39
End: 2024-04-23
Payer: COMMERCIAL

## 2024-04-23 DIAGNOSIS — T07.XXXA FRACTURES: Primary | ICD-10-CM

## 2024-04-24 NOTE — TELEPHONE ENCOUNTER
Patient with history of rib fractures in 2018 spine fracture in 2022 and a leg fracture in 2023.  DEXA scan ordered.

## 2024-09-11 ENCOUNTER — OFFICE VISIT (OUTPATIENT)
Dept: FAMILY MEDICINE | Facility: CLINIC | Age: 39
End: 2024-09-11
Attending: FAMILY MEDICINE
Payer: COMMERCIAL

## 2024-09-11 VITALS
DIASTOLIC BLOOD PRESSURE: 75 MMHG | BODY MASS INDEX: 26.66 KG/M2 | RESPIRATION RATE: 14 BRPM | SYSTOLIC BLOOD PRESSURE: 112 MMHG | HEIGHT: 66 IN | WEIGHT: 165.9 LBS | OXYGEN SATURATION: 96 % | HEART RATE: 88 BPM | TEMPERATURE: 97.3 F

## 2024-09-11 DIAGNOSIS — F41.1 GAD (GENERALIZED ANXIETY DISORDER): ICD-10-CM

## 2024-09-11 DIAGNOSIS — F51.01 PRIMARY INSOMNIA: ICD-10-CM

## 2024-09-11 DIAGNOSIS — Z00.00 ROUTINE GENERAL MEDICAL EXAMINATION AT A HEALTH CARE FACILITY: Primary | ICD-10-CM

## 2024-09-11 PROCEDURE — 90656 IIV3 VACC NO PRSV 0.5 ML IM: CPT | Performed by: FAMILY MEDICINE

## 2024-09-11 PROCEDURE — 90471 IMMUNIZATION ADMIN: CPT | Performed by: FAMILY MEDICINE

## 2024-09-11 PROCEDURE — 99395 PREV VISIT EST AGE 18-39: CPT | Mod: 25 | Performed by: FAMILY MEDICINE

## 2024-09-11 PROCEDURE — 99214 OFFICE O/P EST MOD 30 MIN: CPT | Mod: 25 | Performed by: FAMILY MEDICINE

## 2024-09-11 RX ORDER — TRAZODONE HYDROCHLORIDE 50 MG/1
50 TABLET, FILM COATED ORAL AT BEDTIME
Qty: 90 TABLET | Refills: 4 | Status: SHIPPED | OUTPATIENT
Start: 2024-09-11

## 2024-09-11 RX ORDER — ESCITALOPRAM OXALATE 20 MG/1
20 TABLET ORAL DAILY
Qty: 90 TABLET | Refills: 4 | Status: SHIPPED | OUTPATIENT
Start: 2024-09-11

## 2024-09-11 ASSESSMENT — SOCIAL DETERMINANTS OF HEALTH (SDOH): HOW OFTEN DO YOU GET TOGETHER WITH FRIENDS OR RELATIVES?: ONCE A WEEK

## 2024-09-11 NOTE — PATIENT INSTRUCTIONS
Patient Education   Preventive Care Advice   This is general advice given by our system to help you stay healthy. However, your care team may have specific advice just for you. Please talk to your care team about your preventive care needs.  Nutrition  Eat 5 or more servings of fruits and vegetables each day.  Try wheat bread, brown rice and whole grain pasta (instead of white bread, rice, and pasta).  Get enough calcium and vitamin D. Check the label on foods and aim for 100% of the RDA (recommended daily allowance).  Lifestyle  Exercise at least 150 minutes each week  (30 minutes a day, 5 days a week).  Do muscle strengthening activities 2 days a week. These help control your weight and prevent disease.  No smoking.  Wear sunscreen to prevent skin cancer.  Have a dental exam and cleaning every 6 months.  Yearly exams  See your health care team every year to talk about:  Any changes in your health.  Any medicines your care team has prescribed.  Preventive care, family planning, and ways to prevent chronic diseases.  Shots (vaccines)   HPV shots (up to age 26), if you've never had them before.  Hepatitis B shots (up to age 59), if you've never had them before.  COVID-19 shot: Get this shot when it's due.  Flu shot: Get a flu shot every year.  Tetanus shot: Get a tetanus shot every 10 years.  Pneumococcal, hepatitis A, and RSV shots: Ask your care team if you need these based on your risk.  Shingles shot (for age 50 and up)  General health tests  Diabetes screening:  Starting at age 35, Get screened for diabetes at least every 3 years.  If you are younger than age 35, ask your care team if you should be screened for diabetes.  Cholesterol test: At age 39, start having a cholesterol test every 5 years, or more often if advised.  Bone density scan (DEXA): At age 50, ask your care team if you should have this scan for osteoporosis (brittle bones).  Hepatitis C: Get tested at least once in your life.  STIs (sexually  transmitted infections)  Before age 24: Ask your care team if you should be screened for STIs.  After age 24: Get screened for STIs if you're at risk. You are at risk for STIs (including HIV) if:  You are sexually active with more than one person.  You don't use condoms every time.  You or a partner was diagnosed with a sexually transmitted infection.  If you are at risk for HIV, ask about PrEP medicine to prevent HIV.  Get tested for HIV at least once in your life, whether you are at risk for HIV or not.  Cancer screening tests  Cervical cancer screening: If you have a cervix, begin getting regular cervical cancer screening tests starting at age 21.  Breast cancer scan (mammogram): If you've ever had breasts, begin having regular mammograms starting at age 40. This is a scan to check for breast cancer.  Colon cancer screening: It is important to start screening for colon cancer at age 45.  Have a colonoscopy test every 10 years (or more often if you're at risk) Or, ask your provider about stool tests like a FIT test every year or Cologuard test every 3 years.  To learn more about your testing options, visit:   .  For help making a decision, visit:   https://bit.ly/vo20369.  Prostate cancer screening test: If you have a prostate, ask your care team if a prostate cancer screening test (PSA) at age 55 is right for you.  Lung cancer screening: If you are a current or former smoker ages 50 to 80, ask your care team if ongoing lung cancer screenings are right for you.  For informational purposes only. Not to replace the advice of your health care provider. Copyright   2023 [facility]. All rights reserved. Clinically reviewed by the [facility]. [employer] 364609 - REV 01/24.  Learning About Stress  What is stress?     Stress is your body's response to a hard situation. Your body can have a physical, emotional, or mental response. Stress is a fact of life for most people, and it  affects everyone differently. What causes stress for you may not be stressful for someone else.  A lot of things can cause stress. You may feel stress when you go on a job interview, take a test, or run a race. This kind of short-term stress is normal and even useful. It can help you if you need to work hard or react quickly. For example, stress can help you finish an important job on time.  Long-term stress is caused by ongoing stressful situations or events. Examples of long-term stress include long-term health problems, ongoing problems at work, or conflicts in your family. Long-term stress can harm your health.  How does stress affect your health?  When you are stressed, your body responds as though you are in danger. It makes hormones that speed up your heart, make you breathe faster, and give you a burst of energy. This is called the fight-or-flight stress response. If the stress is over quickly, your body goes back to normal and no harm is done.  But if stress happens too often or lasts too long, it can have bad effects. Long-term stress can make you more likely to get sick, and it can make symptoms of some diseases worse. If you tense up when you are stressed, you may develop neck, shoulder, or low back pain. Stress is linked to high blood pressure and heart disease.  Stress also harms your emotional health. It can make you vazquez, tense, or depressed. Your relationships may suffer, and you may not do well at work or school.  What can you do to manage stress?  You can try these things to help manage stress:   Do something active. Exercise or activity can help reduce stress. Walking is a great way to get started. Even everyday activities such as housecleaning or yard work can help.  Try yoga or rosalind chi. These techniques combine exercise and meditation. You may need some training at first to learn them.  Do something you enjoy. For example, listen to music or go to a movie. Practice your hobby or do volunteer  "work.  Meditate. This can help you relax, because you are not worrying about what happened before or what may happen in the future.  Do guided imagery. Imagine yourself in any setting that helps you feel calm. You can use online videos, books, or a teacher to guide you.  Do breathing exercises. For example:  From a standing position, bend forward from the waist with your knees slightly bent. Let your arms dangle close to the floor.  Breathe in slowly and deeply as you return to a standing position. Roll up slowly and lift your head last.  Hold your breath for just a few seconds in the standing position.  Breathe out slowly and bend forward from the waist.  Let your feelings out. Talk, laugh, cry, and express anger when you need to. Talking with supportive friends or family, a counselor, or a gomez leader about your feelings is a healthy way to relieve stress. Avoid discussing your feelings with people who make you feel worse.  Write. It may help to write about things that are bothering you. This helps you find out how much stress you feel and what is causing it. When you know this, you can find better ways to cope.  What can you do to prevent stress?  You might try some of these things to help prevent stress:  Manage your time. This helps you find time to do the things you want and need to do.  Get enough sleep. Your body recovers from the stresses of the day while you are sleeping.  Get support. Your family, friends, and community can make a difference in how you experience stress.  Limit your news feed. Avoid or limit time on social media or news that may make you feel stressed.  Do something active. Exercise or activity can help reduce stress. Walking is a great way to get started.  Where can you learn more?  Go to https://www.healthwise.net/patiented  Enter N032 in the search box to learn more about \"Learning About Stress.\"  Current as of: October 24, 2023               Content Version: 14.0    7394-7836 " Healthwise, [employer].   Care instructions adapted under license by your healthcare professional. If you have questions about a medical condition or this instruction, always ask your healthcare professional. [employer] disclaims any warranty or liability for your use of this information.

## 2024-09-11 NOTE — PROGRESS NOTES
"Preventive Care Visit  [facility]  Laura Huang MD, Family Medicine  Sep 11, 2024      Assessment & Plan     Routine general medical examination at a health care facility  Health maintenance updated, preventive services reviewed, vaccines discussed, questions are answered, patient encouraged to continue annual wellness visits to review preventive services  Declines Pap smear this year but will plan on doing Pap smear next year.    KIRSTIN (generalized anxiety disorder)  Stable on current regimen, refilled medication  - escitalopram (LEXAPRO) 20 MG tablet; Take 1 tablet (20 mg) by mouth daily.    Primary insomnia  Recommended patient consider other environment adaptation.  Can use trazodone as needed to help with sleep.  If not improving consider referral for CBT  - traZODone (DESYREL) 50 MG tablet; Take 1 tablet (50 mg) by mouth at bedtime.            BMI  Estimated body mass index is 26.78 kg/m  as calculated from the following:    Height as of this encounter: 1.676 m (5' 6\").    Weight as of this encounter: 75.3 kg (165 lb 14.4 oz).       Counseling  Appropriate preventive services were addressed with this patient via screening, questionnaire, or discussion as appropriate for fall prevention, nutrition, physical activity, Tobacco-use cessation, social engagement, weight loss and cognition.  Checklist reviewing preventive services available has been given to the patient.  Reviewed patient's diet, addressing concerns and/or questions.   She is at risk for lack of exercise and has been provided with information to increase physical activity for the benefit of her well-being.           Christel Quarles is a 39 year old, presenting for the following:  Physical        9/11/2024     8:08 AM   Additional Questions   Roomed by Linda        Health Care Directive  Patient does not have a Health Care Directive or Living Will: Discussed advance care planning with patient; however, patient " declined at this time.    HPI  Here for annual exam.  Also notes significant issues with insomnia.  Patient has a hard time falling asleep until 11 PM and then often will awaken early in the morning.  Has a hard time getting back to sleep when she wakes up in the middle of the night.  Has tried to adjust bedtime routines and actually goes to bed quite early but has a hard time falling asleep.  Does have a TV in her room but otherwise environment is dark and cool with white noise present.              9/11/2024   General Health   How would you rate your overall physical health? Good   Feel stress (tense, anxious, or unable to sleep) Very much      (!) STRESS CONCERN      9/11/2024   Nutrition   Three or more servings of calcium each day? Yes   Diet: Gluten-free/reduced   How many servings of fruit and vegetables per day? (!) 2-3   How many sweetened beverages each day? 0-1            9/11/2024   Exercise   Days per week of moderate/strenous exercise 3 days   Average minutes spent exercising at this level 20 min            9/11/2024   Social Factors   Frequency of gathering with friends or relatives Once a week   Worry food won't last until get money to buy more No   Food not last or not have enough money for food? No   Do you have housing? (Housing is defined as stable permanent housing and does not include staying ouside in a car, in a tent, in an abandoned building, in an overnight shelter, or couch-surfing.) Yes   Are you worried about losing your housing? No   Lack of transportation? No   Unable to get utilities (heat,electricity)? No            9/11/2024   Dental   Dentist two times every year? Yes            9/11/2024   TB Screening   Were you born outside of the [place]? No            Today's PHQ-2 Score:       9/11/2024     7:55 AM   PHQ-2 ( 1999 [employer])   Q1: Little interest or pleasure in doing things 1   Q2: Feeling down, depressed or hopeless 1   PHQ-2 Score 2   Q1: Little interest or pleasure in doing things  "Several days   Q2: Feeling down, depressed or hopeless Several days   PHQ-2 Score 2           9/11/2024   Substance Use   Alcohol more than 3/day or more than 7/wk No   Do you use any other substances recreationally? CBD products for sleep recommended by health provider        Social History     Tobacco Use    Smoking status: Never     Passive exposure: Never    Smokeless tobacco: Never   Vaping Use    Vaping status: Never Used   Substance Use Topics    Alcohol use: Yes    Drug use: Never                  9/11/2024   STI Screening   New sexual partner(s) since last STI/HIV test? No        History of abnormal Pap smear: No - age 30- 64 PAP with HPV every 5 years recommended        5/22/2019    10:55 AM 7/14/2005    12:00 AM   PAP / HPV   PAP (Historical)  NIL    PAP-ABSTRACT See Scanned Document            This result is from an external source.           9/11/2024   Contraception/Family Planning   Questions about contraception or family planning No           Reviewed and updated as needed this visit by Provider   Tobacco  Allergies  Meds  Problems  Med Hx  Surg Hx  Fam Hx                     Objective    Exam  /75 (BP Location: Right arm, Patient Position: Sitting, Cuff Size: Adult Large)   Pulse 88   Temp 97.3  F (36.3  C) (Tympanic)   Resp 14   Ht 1.676 m (5' 6\")   Wt 75.3 kg (165 lb 14.4 oz)   LMP 08/13/2024 (Approximate)   SpO2 96%   BMI 26.78 kg/m     Estimated body mass index is 26.78 kg/m  as calculated from the following:    Height as of this encounter: 1.676 m (5' 6\").    Weight as of this encounter: 75.3 kg (165 lb 14.4 oz).    Physical Exam  GENERAL: alert and no distress  EYES: Eyes grossly normal to inspection, PERRL and conjunctivae and sclerae normal  HENT: ear canals and TM's normal, nose and mouth without ulcers or lesions  NECK: no adenopathy, no asymmetry, masses, or scars  RESP: lungs clear to auscultation - no rales, rhonchi or wheezes  CV: regular rate and rhythm, normal S1 " S2, no S3 or S4, no murmur, click or rub, no peripheral edema  MS: no gross musculoskeletal defects noted, no edema  SKIN: no suspicious lesions or rashes  NEURO: Normal strength and tone, mentation intact and speech normal  PSYCH: mentation appears normal, affect normal/bright        Signed Electronically by: Laura Huang MD

## 2024-12-16 ENCOUNTER — ANCILLARY PROCEDURE (OUTPATIENT)
Dept: BONE DENSITY | Facility: CLINIC | Age: 39
End: 2024-12-16
Attending: FAMILY MEDICINE
Payer: COMMERCIAL

## 2024-12-16 DIAGNOSIS — T07.XXXA FRACTURES: ICD-10-CM

## 2024-12-16 PROCEDURE — 77080 DXA BONE DENSITY AXIAL: CPT | Mod: TC | Performed by: PHYSICIAN ASSISTANT

## 2024-12-16 PROCEDURE — 77091 TBS TECHL CALCULATION ONLY: CPT | Performed by: PHYSICIAN ASSISTANT

## 2025-02-12 DIAGNOSIS — L70.9 ADULT ACNE: ICD-10-CM

## 2025-02-13 RX ORDER — TRETINOIN 1 MG/G
CREAM TOPICAL
Qty: 45 G | Refills: 3 | Status: SHIPPED | OUTPATIENT
Start: 2025-02-13

## 2025-04-03 DIAGNOSIS — T75.3XXA TRAVEL SICKNESS: Primary | ICD-10-CM

## 2025-04-03 RX ORDER — SCOPOLAMINE 1 MG/3D
1 PATCH, EXTENDED RELEASE TRANSDERMAL
Qty: 10 PATCH | Refills: 1 | Status: SHIPPED | OUTPATIENT
Start: 2025-04-03

## 2025-04-03 NOTE — TELEPHONE ENCOUNTER
New Medication Request    Contacts       Contact Date/Time Type Contact Phone/Fax    04/03/2025 03:24 PM CDT Phone (Incoming) Robina Desir (Self) [phone] (M)            What medication are you requesting?: Scopolamine Patch    Reason for medication request: Travel Sickness due to terrain    Have you taken this medication before?: Yes:     Controlled Substance Agreement on file:   CSA -- Patient Level:    CSA: None found at the patient level.         Patient offered an appointment? No    Preferred Pharmacy:     [id number] - [place], [place] - [address] [place] [address] AT [address] [facility] [employer] [address]  Phone: [phone] Fax: [phone]      Could we send this information to you in [employer] or would you prefer to receive a phone call?:   Patient would prefer a phone call   Okay to leave a detailed message?: Yes at Cell number on file:    Telephone Information:   Mobile [phone]

## 2025-05-30 ENCOUNTER — RESULTS FOLLOW-UP (OUTPATIENT)
Dept: FAMILY MEDICINE | Facility: CLINIC | Age: 40
End: 2025-05-30

## 2025-08-01 ENCOUNTER — TRANSFERRED RECORDS (OUTPATIENT)
Dept: HEALTH INFORMATION MANAGEMENT | Facility: CLINIC | Age: 40
End: 2025-08-01
Payer: COMMERCIAL

## 2025-08-12 ENCOUNTER — PATIENT OUTREACH (OUTPATIENT)
Dept: CARE COORDINATION | Facility: CLINIC | Age: 40
End: 2025-08-12
Payer: COMMERCIAL

## 2025-09-03 ENCOUNTER — OFFICE VISIT (OUTPATIENT)
Dept: FAMILY MEDICINE | Facility: CLINIC | Age: 40
End: 2025-09-03
Payer: COMMERCIAL

## 2025-09-03 VITALS
SYSTOLIC BLOOD PRESSURE: 112 MMHG | TEMPERATURE: 98.6 F | OXYGEN SATURATION: 98 % | DIASTOLIC BLOOD PRESSURE: 70 MMHG | HEIGHT: 66 IN | BODY MASS INDEX: 28.54 KG/M2 | HEART RATE: 67 BPM | RESPIRATION RATE: 10 BRPM | WEIGHT: 177.6 LBS

## 2025-09-03 DIAGNOSIS — N39.0 RECURRENT UTI: Primary | ICD-10-CM

## 2025-09-03 DIAGNOSIS — F41.1 GAD (GENERALIZED ANXIETY DISORDER): ICD-10-CM

## 2025-09-03 DIAGNOSIS — F51.01 PRIMARY INSOMNIA: ICD-10-CM

## 2025-09-03 PROCEDURE — 90656 IIV3 VACC NO PRSV 0.5 ML IM: CPT | Performed by: FAMILY MEDICINE

## 2025-09-03 PROCEDURE — 90471 IMMUNIZATION ADMIN: CPT | Performed by: FAMILY MEDICINE

## 2025-09-03 PROCEDURE — 3078F DIAST BP <80 MM HG: CPT | Performed by: FAMILY MEDICINE

## 2025-09-03 PROCEDURE — 3074F SYST BP LT 130 MM HG: CPT | Performed by: FAMILY MEDICINE

## 2025-09-03 PROCEDURE — 99214 OFFICE O/P EST MOD 30 MIN: CPT | Mod: 25 | Performed by: FAMILY MEDICINE

## 2025-09-03 RX ORDER — TRAZODONE HYDROCHLORIDE 50 MG/1
50 TABLET ORAL AT BEDTIME
Qty: 90 TABLET | Refills: 4 | Status: SHIPPED | OUTPATIENT
Start: 2025-09-03

## 2025-09-03 RX ORDER — TRAZODONE HYDROCHLORIDE 50 MG/1
50 TABLET ORAL AT BEDTIME
Qty: 90 TABLET | Refills: 4 | Status: CANCELLED | OUTPATIENT
Start: 2025-09-03

## 2025-09-03 RX ORDER — SULFAMETHOXAZOLE AND TRIMETHOPRIM 800; 160 MG/1; MG/1
1 TABLET ORAL 2 TIMES DAILY
Qty: 10 TABLET | Refills: 1 | Status: SHIPPED | OUTPATIENT
Start: 2025-09-03

## 2025-09-03 RX ORDER — ESCITALOPRAM OXALATE 20 MG/1
20 TABLET ORAL DAILY
Qty: 90 TABLET | Refills: 4 | Status: SHIPPED | OUTPATIENT
Start: 2025-09-03

## 2025-09-03 RX ORDER — ESCITALOPRAM OXALATE 20 MG/1
20 TABLET ORAL DAILY
Qty: 90 TABLET | Refills: 4 | Status: CANCELLED | OUTPATIENT
Start: 2025-09-03